# Patient Record
Sex: FEMALE
[De-identification: names, ages, dates, MRNs, and addresses within clinical notes are randomized per-mention and may not be internally consistent; named-entity substitution may affect disease eponyms.]

---

## 2024-09-17 ENCOUNTER — APPOINTMENT (OUTPATIENT)
Dept: INTERNAL MEDICINE | Facility: CLINIC | Age: 64
End: 2024-09-17
Payer: COMMERCIAL

## 2024-09-17 ENCOUNTER — NON-APPOINTMENT (OUTPATIENT)
Age: 64
End: 2024-09-17

## 2024-09-17 VITALS
OXYGEN SATURATION: 99 % | DIASTOLIC BLOOD PRESSURE: 82 MMHG | HEART RATE: 58 BPM | BODY MASS INDEX: 26 KG/M2 | RESPIRATION RATE: 16 BRPM | HEIGHT: 59 IN | WEIGHT: 129 LBS | SYSTOLIC BLOOD PRESSURE: 120 MMHG

## 2024-09-17 DIAGNOSIS — Z00.00 ENCOUNTER FOR GENERAL ADULT MEDICAL EXAMINATION W/OUT ABNORMAL FINDINGS: ICD-10-CM

## 2024-09-17 DIAGNOSIS — Z78.9 OTHER SPECIFIED HEALTH STATUS: ICD-10-CM

## 2024-09-17 DIAGNOSIS — Z82.49 FAMILY HISTORY OF ISCHEMIC HEART DISEASE AND OTHER DISEASES OF THE CIRCULATORY SYSTEM: ICD-10-CM

## 2024-09-17 DIAGNOSIS — I10 ESSENTIAL (PRIMARY) HYPERTENSION: ICD-10-CM

## 2024-09-17 DIAGNOSIS — R73.09 OTHER ABNORMAL GLUCOSE: ICD-10-CM

## 2024-09-17 DIAGNOSIS — E78.00 PURE HYPERCHOLESTEROLEMIA, UNSPECIFIED: ICD-10-CM

## 2024-09-17 DIAGNOSIS — Z83.3 FAMILY HISTORY OF DIABETES MELLITUS: ICD-10-CM

## 2024-09-17 DIAGNOSIS — M81.0 AGE-RELATED OSTEOPOROSIS W/OUT CURRENT PATHOLOGICAL FRACTURE: ICD-10-CM

## 2024-09-17 DIAGNOSIS — G62.9 POLYNEUROPATHY, UNSPECIFIED: ICD-10-CM

## 2024-09-17 PROCEDURE — 93000 ELECTROCARDIOGRAM COMPLETE: CPT

## 2024-09-17 PROCEDURE — 99386 PREV VISIT NEW AGE 40-64: CPT | Mod: 25

## 2024-09-17 RX ORDER — CYANOCOBALAMIN (VITAMIN B-12) 1000 MCG
1000 TABLET ORAL
Qty: 90 | Refills: 3 | Status: ACTIVE | COMMUNITY

## 2024-09-17 RX ORDER — DOCOSAHEXAENOIC ACID 300 MG
50 MCG CAPSULE ORAL
Qty: 30 | Refills: 0 | Status: ACTIVE | COMMUNITY

## 2024-09-17 RX ORDER — GABAPENTIN 300 MG
300 TABLET ORAL TWICE DAILY
Refills: 0 | Status: DISCONTINUED | COMMUNITY
End: 2024-09-17

## 2024-09-17 RX ORDER — ALENDRONATE SODIUM 70 MG/1
70 TABLET ORAL
Qty: 12 | Refills: 3 | Status: ACTIVE | COMMUNITY

## 2024-09-17 RX ORDER — MAGNESIUM OXIDE/MAG AA CHELATE 300 MG
300 CAPSULE ORAL DAILY
Qty: 30 | Refills: 0 | Status: ACTIVE | COMMUNITY

## 2024-09-17 RX ORDER — METOPROLOL SUCCINATE 25 MG/1
25 TABLET, EXTENDED RELEASE ORAL
Qty: 90 | Refills: 3 | Status: ACTIVE | COMMUNITY

## 2024-09-17 NOTE — ASSESSMENT
[FreeTextEntry1] : 64 year old female found to have stable Hypertension, Hypercholesterolemia, Elevated Hemoglobin A1c, Osteoporosis, Peripheral Neuropathy, with the current prescription regimen as recommended, diet and lifestyle modifications, as counseled. Prior results reviewed, interpreted and discussed with the patient during today's examination, as appropriate. Follow up, treatment plan and tests, as ordered.   Patient was recommended to follow up with GYN for routine examination, PAP smear and Mammogram, reports will be provided, when ready.  Flu/TDAp/PNA/Shingles vaccinations as feasible in the future, as per prior history of immunization.  EKG: NSR @ 60 BPM. No acute ST-T changes noted.

## 2024-09-17 NOTE — HISTORY OF PRESENT ILLNESS
[de-identified] : 64 year old female patient with history of stable Hypertension, Hypercholesterolemia, Elevated Hemoglobin A1c, Osteoporosis, Peripheral Neuropathy, history as stated, presented for an initial annual preventative examination.

## 2024-09-17 NOTE — HEALTH RISK ASSESSMENT
[Patient reported mammogram was normal] : Patient reported mammogram was normal [Patient reported PAP Smear was normal] : Patient reported PAP Smear was normal [Patient reported bone density results were abnormal] : Patient reported bone density results were abnormal [Patient reported colonoscopy was normal] : Patient reported colonoscopy was normal [HIV test declined] : HIV test declined [Hepatitis C test offered] : Hepatitis C test offered [Never] : Never [NO] : No [Good] : ~his/her~  mood as  good [No] : In the past 12 months have you used drugs other than those required for medical reasons? No [No falls in past year] : Patient reported no falls in the past year [0] : 2) Feeling down, depressed, or hopeless: Not at all (0) [None] : None [Feels Safe at Home] : Feels safe at home [Fully functional (bathing, dressing, toileting, transferring, walking, feeding)] : Fully functional (bathing, dressing, toileting, transferring, walking, feeding) [Fully functional (using the telephone, shopping, preparing meals, housekeeping, doing laundry, using] : Fully functional and needs no help or supervision to perform IADLs (using the telephone, shopping, preparing meals, housekeeping, doing laundry, using transportation, managing medications and managing finances) [Smoke Detector] : smoke detector [Carbon Monoxide Detector] : carbon monoxide detector [Seat Belt] :  uses seat belt [Sunscreen] : uses sunscreen [With Patient/Caregiver] : , with patient/caregiver [FreeTextEntry1] : Check up  [de-identified] : None [[id number]] : 0 [Change in mental status noted] : No change in mental status noted [Reports changes in hearing] : Reports no changes in hearing [Reports changes in vision] : Reports no changes in vision [Reports changes in dental health] : Reports no changes in dental health [MammogramDate] : 04/23 [PapSmearDate] : 04/24 [BoneDensityDate] : 04/24 [ColonoscopyDate] : 01/19 [AdvancecareDate] : 09/24

## 2024-09-18 LAB
25(OH)D3 SERPL-MCNC: 43.9 NG/ML
ALBUMIN SERPL ELPH-MCNC: 4.5 G/DL
ALP BLD-CCNC: 52 U/L
ALT SERPL-CCNC: 16 U/L
ANION GAP SERPL CALC-SCNC: 10 MMOL/L
APPEARANCE: CLEAR
AST SERPL-CCNC: 17 U/L
BACTERIA: NEGATIVE /HPF
BASOPHILS # BLD AUTO: 0.05 K/UL
BASOPHILS NFR BLD AUTO: 0.8 %
BILIRUB SERPL-MCNC: 0.3 MG/DL
BILIRUBIN URINE: NEGATIVE
BLOOD URINE: NEGATIVE
BUN SERPL-MCNC: 14 MG/DL
CALCIUM SERPL-MCNC: 9.8 MG/DL
CAST: 0 /LPF
CHLORIDE SERPL-SCNC: 106 MMOL/L
CHOLEST SERPL-MCNC: 168 MG/DL
CO2 SERPL-SCNC: 26 MMOL/L
COLOR: YELLOW
CREAT SERPL-MCNC: 0.79 MG/DL
CREAT SPEC-SCNC: 141 MG/DL
EGFR: 85 ML/MIN/1.73M2
EOSINOPHIL # BLD AUTO: 0.14 K/UL
EOSINOPHIL NFR BLD AUTO: 2.3 %
EPITHELIAL CELLS: 7 /HPF
ESTIMATED AVERAGE GLUCOSE: 120 MG/DL
GGT SERPL-CCNC: 12 U/L
GLUCOSE QUALITATIVE U: NEGATIVE MG/DL
GLUCOSE SERPL-MCNC: 103 MG/DL
HBA1C MFR BLD HPLC: 5.8 %
HCT VFR BLD CALC: 40.2 %
HCV AB SER QL: NONREACTIVE
HCV S/CO RATIO: 0.28 S/CO
HDLC SERPL-MCNC: 50 MG/DL
HGB BLD-MCNC: 12.2 G/DL
IMM GRANULOCYTES NFR BLD AUTO: 0.2 %
KETONES URINE: NEGATIVE MG/DL
LDLC SERPL CALC-MCNC: 100 MG/DL
LEUKOCYTE ESTERASE URINE: ABNORMAL
LYMPHOCYTES # BLD AUTO: 2.08 K/UL
LYMPHOCYTES NFR BLD AUTO: 33.7 %
MAN DIFF?: NORMAL
MCHC RBC-ENTMCNC: 28.6 PG
MCHC RBC-ENTMCNC: 30.3 GM/DL
MCV RBC AUTO: 94.4 FL
MICROALBUMIN 24H UR DL<=1MG/L-MCNC: <1.2 MG/DL
MICROALBUMIN/CREAT 24H UR-RTO: NORMAL MG/G
MICROSCOPIC-UA: NORMAL
MONOCYTES # BLD AUTO: 0.34 K/UL
MONOCYTES NFR BLD AUTO: 5.5 %
NEUTROPHILS # BLD AUTO: 3.55 K/UL
NEUTROPHILS NFR BLD AUTO: 57.5 %
NITRITE URINE: NEGATIVE
NONHDLC SERPL-MCNC: 118 MG/DL
PH URINE: 7.5
PLATELET # BLD AUTO: 259 K/UL
POTASSIUM SERPL-SCNC: 4.9 MMOL/L
PROT SERPL-MCNC: 7.1 G/DL
PROTEIN URINE: NEGATIVE MG/DL
RBC # BLD: 4.26 M/UL
RBC # FLD: 13.6 %
RED BLOOD CELLS URINE: 1 /HPF
SODIUM SERPL-SCNC: 143 MMOL/L
SPECIFIC GRAVITY URINE: 1.02
TRIGL SERPL-MCNC: 97 MG/DL
TSH SERPL-ACNC: 2.21 UIU/ML
UROBILINOGEN URINE: 0.2 MG/DL
WBC # FLD AUTO: 6.17 K/UL
WHITE BLOOD CELLS URINE: 2 /HPF

## 2024-09-23 RX ORDER — ROSUVASTATIN CALCIUM 10 MG/1
10 TABLET, FILM COATED ORAL
Qty: 90 | Refills: 3 | Status: ACTIVE | COMMUNITY
Start: 1900-01-01 | End: 1900-01-01

## 2024-09-23 RX ORDER — GABAPENTIN 300 MG/1
300 CAPSULE ORAL
Qty: 180 | Refills: 3 | Status: ACTIVE | COMMUNITY
Start: 2024-09-23 | End: 1900-01-01

## 2024-09-23 RX ORDER — METFORMIN HYDROCHLORIDE 500 MG/1
500 TABLET, COATED ORAL
Qty: 180 | Refills: 3 | Status: ACTIVE | COMMUNITY
Start: 1900-01-01 | End: 1900-01-01

## 2024-11-05 ENCOUNTER — APPOINTMENT (OUTPATIENT)
Dept: INTERNAL MEDICINE | Facility: CLINIC | Age: 64
End: 2024-11-05
Payer: COMMERCIAL

## 2024-11-05 DIAGNOSIS — Z23 ENCOUNTER FOR IMMUNIZATION: ICD-10-CM

## 2024-11-05 PROCEDURE — 90656 IIV3 VACC NO PRSV 0.5 ML IM: CPT

## 2024-11-05 PROCEDURE — G0008: CPT

## 2024-12-31 ENCOUNTER — APPOINTMENT (OUTPATIENT)
Dept: INTERNAL MEDICINE | Facility: CLINIC | Age: 64
End: 2024-12-31
Payer: COMMERCIAL

## 2024-12-31 VITALS
WEIGHT: 126 LBS | SYSTOLIC BLOOD PRESSURE: 135 MMHG | RESPIRATION RATE: 16 BRPM | DIASTOLIC BLOOD PRESSURE: 84 MMHG | BODY MASS INDEX: 25.4 KG/M2 | HEIGHT: 59 IN | HEART RATE: 78 BPM | TEMPERATURE: 97.5 F | OXYGEN SATURATION: 98 %

## 2024-12-31 DIAGNOSIS — E78.00 PURE HYPERCHOLESTEROLEMIA, UNSPECIFIED: ICD-10-CM

## 2024-12-31 DIAGNOSIS — M81.0 AGE-RELATED OSTEOPOROSIS W/OUT CURRENT PATHOLOGICAL FRACTURE: ICD-10-CM

## 2024-12-31 DIAGNOSIS — G62.9 POLYNEUROPATHY, UNSPECIFIED: ICD-10-CM

## 2024-12-31 DIAGNOSIS — I10 ESSENTIAL (PRIMARY) HYPERTENSION: ICD-10-CM

## 2024-12-31 DIAGNOSIS — R73.09 OTHER ABNORMAL GLUCOSE: ICD-10-CM

## 2024-12-31 PROCEDURE — 99213 OFFICE O/P EST LOW 20 MIN: CPT

## 2025-04-01 ENCOUNTER — APPOINTMENT (OUTPATIENT)
Dept: INTERNAL MEDICINE | Facility: CLINIC | Age: 65
End: 2025-04-01

## 2025-05-06 ENCOUNTER — APPOINTMENT (OUTPATIENT)
Dept: INTERNAL MEDICINE | Facility: CLINIC | Age: 65
End: 2025-05-06

## 2025-06-30 ENCOUNTER — APPOINTMENT (OUTPATIENT)
Dept: INTERNAL MEDICINE | Facility: CLINIC | Age: 65
End: 2025-06-30
Payer: COMMERCIAL

## 2025-06-30 VITALS
DIASTOLIC BLOOD PRESSURE: 84 MMHG | RESPIRATION RATE: 16 BRPM | OXYGEN SATURATION: 99 % | TEMPERATURE: 98.4 F | BODY MASS INDEX: 24.6 KG/M2 | WEIGHT: 122 LBS | SYSTOLIC BLOOD PRESSURE: 135 MMHG | HEIGHT: 59 IN | HEART RATE: 71 BPM

## 2025-06-30 PROCEDURE — 99213 OFFICE O/P EST LOW 20 MIN: CPT | Mod: 25

## 2025-07-01 LAB
ALBUMIN SERPL ELPH-MCNC: 4.4 G/DL
ALP BLD-CCNC: 51 U/L
ALT SERPL-CCNC: 21 U/L
ANION GAP SERPL CALC-SCNC: 12 MMOL/L
AST SERPL-CCNC: 27 U/L
BASOPHILS # BLD AUTO: 0.06 K/UL
BASOPHILS NFR BLD AUTO: 1.1 %
BILIRUB SERPL-MCNC: 0.4 MG/DL
BUN SERPL-MCNC: 13 MG/DL
CALCIUM SERPL-MCNC: 9.7 MG/DL
CHLORIDE SERPL-SCNC: 107 MMOL/L
CHOLEST SERPL-MCNC: 129 MG/DL
CO2 SERPL-SCNC: 24 MMOL/L
CREAT SERPL-MCNC: 0.84 MG/DL
EGFRCR SERPLBLD CKD-EPI 2021: 79 ML/MIN/1.73M2
EOSINOPHIL # BLD AUTO: 0.13 K/UL
EOSINOPHIL NFR BLD AUTO: 2.4 %
ESTIMATED AVERAGE GLUCOSE: 114 MG/DL
FOLATE SERPL-MCNC: >20 NG/ML
GGT SERPL-CCNC: 11 U/L
GLUCOSE SERPL-MCNC: 101 MG/DL
HBA1C MFR BLD HPLC: 5.6 %
HCT VFR BLD CALC: 38.3 %
HDLC SERPL-MCNC: 55 MG/DL
HGB BLD-MCNC: 12.1 G/DL
IMM GRANULOCYTES NFR BLD AUTO: 0.2 %
LDLC SERPL-MCNC: 59 MG/DL
LYMPHOCYTES # BLD AUTO: 2.03 K/UL
LYMPHOCYTES NFR BLD AUTO: 36.7 %
MAN DIFF?: NORMAL
MCHC RBC-ENTMCNC: 29.4 PG
MCHC RBC-ENTMCNC: 31.6 G/DL
MCV RBC AUTO: 93 FL
MONOCYTES # BLD AUTO: 0.36 K/UL
MONOCYTES NFR BLD AUTO: 6.5 %
NEUTROPHILS # BLD AUTO: 2.94 K/UL
NEUTROPHILS NFR BLD AUTO: 53.1 %
NONHDLC SERPL-MCNC: 73 MG/DL
PLATELET # BLD AUTO: 208 K/UL
POTASSIUM SERPL-SCNC: 4.5 MMOL/L
PROT SERPL-MCNC: 6.8 G/DL
RBC # BLD: 4.12 M/UL
RBC # FLD: 13.3 %
SODIUM SERPL-SCNC: 142 MMOL/L
TRIGL SERPL-MCNC: 67 MG/DL
TSH SERPL-ACNC: 2.08 UIU/ML
VIT B12 SERPL-MCNC: 1314 PG/ML
WBC # FLD AUTO: 5.53 K/UL

## 2025-07-15 ENCOUNTER — APPOINTMENT (OUTPATIENT)
Dept: PAIN MANAGEMENT | Facility: CLINIC | Age: 65
End: 2025-07-15
Payer: COMMERCIAL

## 2025-07-15 VITALS
HEIGHT: 59 IN | DIASTOLIC BLOOD PRESSURE: 78 MMHG | BODY MASS INDEX: 25 KG/M2 | WEIGHT: 124 LBS | SYSTOLIC BLOOD PRESSURE: 126 MMHG | HEART RATE: 63 BPM

## 2025-07-15 PROCEDURE — 99204 OFFICE O/P NEW MOD 45 MIN: CPT

## 2025-07-15 PROCEDURE — 99214 OFFICE O/P EST MOD 30 MIN: CPT
